# Patient Record
Sex: FEMALE | Race: WHITE | URBAN - METROPOLITAN AREA
[De-identification: names, ages, dates, MRNs, and addresses within clinical notes are randomized per-mention and may not be internally consistent; named-entity substitution may affect disease eponyms.]

---

## 2024-07-20 ENCOUNTER — OFFICE VISIT (OUTPATIENT)
Age: 74
End: 2024-07-20

## 2024-07-20 VITALS
TEMPERATURE: 99.9 F | BODY MASS INDEX: 27.16 KG/M2 | HEART RATE: 91 BPM | HEIGHT: 66 IN | WEIGHT: 169 LBS | DIASTOLIC BLOOD PRESSURE: 76 MMHG | SYSTOLIC BLOOD PRESSURE: 118 MMHG | OXYGEN SATURATION: 94 %

## 2024-07-20 DIAGNOSIS — R52 BODY ACHES: ICD-10-CM

## 2024-07-20 DIAGNOSIS — U07.1 COVID-19: Primary | ICD-10-CM

## 2024-07-20 LAB
Lab: ABNORMAL
PERFORMING INSTRUMENT: ABNORMAL
QC PASS/FAIL: ABNORMAL
SARS-COV-2, POC: DETECTED
STREPTOCOCCUS A RNA: NEGATIVE

## 2024-07-20 RX ORDER — ATORVASTATIN CALCIUM 10 MG/1
TABLET, FILM COATED ORAL
COMMUNITY
Start: 2023-11-06

## 2024-07-20 RX ORDER — METOPROLOL SUCCINATE 50 MG/1
50 TABLET, EXTENDED RELEASE ORAL DAILY
COMMUNITY
Start: 2023-12-12

## 2024-07-20 RX ORDER — MULTIVIT-MIN/IRON/FOLIC ACID/K 18-600-40
CAPSULE ORAL
COMMUNITY

## 2024-07-20 RX ORDER — ESZOPICLONE 3 MG/1
1 TABLET, FILM COATED ORAL NIGHTLY PRN
COMMUNITY
Start: 2024-06-13

## 2024-07-20 RX ORDER — LEVOTHYROXINE SODIUM 0.1 MG/1
100 TABLET ORAL DAILY
COMMUNITY
Start: 2009-02-16

## 2024-07-20 RX ORDER — MULTIVITAMIN WITH IRON
1 TABLET ORAL DAILY
COMMUNITY
Start: 2019-09-30

## 2024-07-20 RX ORDER — EZETIMIBE 10 MG/1
10 TABLET ORAL DAILY
COMMUNITY
Start: 2023-12-12

## 2024-07-20 RX ORDER — GLUC/MSM/COLGN2/HYAL/ANTIARTH3 375-375-20
TABLET ORAL DAILY
COMMUNITY

## 2024-07-20 NOTE — PROGRESS NOTES
Eliz Kaur (:  1950) is a 73 y.o. female,New patient, here for evaluation of the following chief complaint(s):  Sore Throat (Patient c/o sore throat, nasal congestion, headaches, body aches, diarrhea, ear fullness x2 days. Home COVID test resulted negative yesterday)      ASSESSMENT/PLAN:  Visit Diagnoses and Associated Orders       COVID-19    -  Primary    POCT COVID-19, Antigen [PHQ603 Custom]      POCT Rapid Strep A DNA [31988 Custom]           Body aches             ORDERS WITHOUT AN ASSOCIATED DIAGNOSIS    Ascorbic Acid (VITAMIN C) 500 MG CAPS [136534]      atorvastatin (LIPITOR) 10 MG tablet [65399]      Calcium Carbonate-Vitamin D 600-5 MG-MCG CAPS [737418]      Cetirizine HCl 10 MG CAPS [797133]      eszopiclone (LUNESTA) 3 MG TABS [14638]      ezetimibe (ZETIA) 10 MG tablet [33337]      levothyroxine (SYNTHROID) 100 MCG tablet [4423]      metoprolol succinate (TOPROL XL) 50 MG extended release tablet [43502]      Multiple Vitamin (MULTIVITAMIN) TABS tablet [5225]             COVID is NEGATIVE.  STREP is NEGATIVE.  Suspect viral illness.  Recommend staying hydrated, rested and Tylenol as needed for body aches and fever.  Return if symptoms persist or change.  Proceed to hospital for evaluation if any worsening symptoms or concerns such as persistent fever, weakness, vomiting or other new concerns..    ADDENDUM  After Pt was discharged the MA reports that test line on COVID test became positive.  Contacted by phone and discussed findings.  Pt would like to take Paxlovid.  She did have positive test, onset < 5 days, Chronic med history, moderate symptoms, denies history of kidney or renal impairment.  She was instructed to hold Statin while on Paxlovid.  She was instructed about lowering dose of Lunesta to 1.5 mg.  Monitoring use of Synthroid.  She was instructed to go to hospital for any worsening symptoms such as shortness of breath, chest pain or persistent fever or

## 2024-07-20 NOTE — PATIENT INSTRUCTIONS
COVID is NEGATIVE.  STREP is NEGATIVE.  Suspect viral illness.  Recommend staying hydrated, rested and Tylenol as needed for body aches and fever.  Return if symptoms persist or change.  Proceed to hospital for evaluation if any worsening symptoms or concerns such as persistent fever, weakness, vomiting or other new concerns..